# Patient Record
Sex: MALE | Race: WHITE
[De-identification: names, ages, dates, MRNs, and addresses within clinical notes are randomized per-mention and may not be internally consistent; named-entity substitution may affect disease eponyms.]

---

## 2019-12-16 ENCOUNTER — HOSPITAL ENCOUNTER (EMERGENCY)
Dept: HOSPITAL 41 - JD.ED | Age: 24
Discharge: HOME | End: 2019-12-16
Payer: SELF-PAY

## 2019-12-16 DIAGNOSIS — F17.210: ICD-10-CM

## 2019-12-16 DIAGNOSIS — H61.22: Primary | ICD-10-CM

## 2019-12-16 NOTE — EDM.PDOC
ED HPI GENERAL MEDICAL PROBLEM





- General


Chief Complaint: ENT Problem


Stated Complaint: EAR RINGING AND SOUND IS MUFFLED


Time Seen by Provider: 12/16/19 16:55


Source of Information: Reports: Patient


History Limitations: Reports: No Limitations





- History of Present Illness


INITIAL COMMENTS - FREE TEXT/NARRATIVE: 





This unfortunate 24-year-old  male presents in respiratory a complaint 

of decreased hearing to right ear. Patient reports symptoms started 2 days ago 

and progressively worsened since no fever no chills no nausea no vomiting no 

shortness of breath.


  ** Left Ear


Pain Score (Numeric/FACES): 4





- Related Data


 Allergies











Allergy/AdvReac Type Severity Reaction Status Date / Time


 


No Known Allergies Allergy   Verified 12/16/19 17:01











Home Meds: 


 Home Meds





. [No Known Home Meds]  12/16/19 [History]











Past Medical History





- Past Health History


Medical/Surgical History: Denies Medical/Surgical History





Social & Family History





- Tobacco Use


Smoking Status *Q: Current Every Day Smoker


Years of Tobacco use: 4


Packs/Tins Daily: 0.1





ED ROS ENT





- Review of Systems


Review Of Systems: See Below


Constitutional: Denies: Fever, Chills


HEENT: Reports: Hearing Loss.  Denies: Ear Discharge





ED EXAM, ENT





- Physical Exam


Exam: See Below


Exam Limited By: No Limitations


General Appearance: Alert, WD/WN, Mild Distress


Ears: TM Obscured by Cerumen (Left ear)


Nose: Normal Inspection, Normal Mucousa, No Blood


Head: Atraumatic, Normocephalic


Respiratory/Chest: No Respiratory Distress, Lungs Clear, Normal Breath Sounds, 

No Accessory Muscle Use, Chest Non-Tender


Cardiovascular: Normal Peripheral Pulses


GI/Abdominal: Normal Bowel Sounds, Soft, Non-Tender, No Organomegaly, No 

Distention, No Abnormal Bruit, No Mass


Extremities: Normal Inspection, Normal Range of Motion, Non-Tender, No Pedal 

Edema, Normal Capillary Refill


Skin: Warm, Dry, No Rash





ED ENT PROCEDURES





- Additional/Other Procedure(s)


Other (Free Text) Procedure(s): 





Irrigation left ear, irrigated with warm water large amount of cerumen removed 

post evaluation TM normal, canal clear





Course





- Vital Signs


Last Recorded V/S: 





 Last Vital Signs











Temp  98.7 F   12/16/19 16:57


 


Pulse  75   12/16/19 16:57


 


Resp  16   12/16/19 16:57


 


BP  126/70   12/16/19 16:57


 


Pulse Ox  96   12/16/19 16:57














Departure





- Departure


Time of Disposition: 17:17


Disposition: Home, Self-Care 01


Clinical Impression: 


 Impacted cerumen of left ear








- Discharge Information


Referrals: 


PCP,None [Primary Care Provider] - 


Additional Instructions: 


Home, rest, return as needed





Sepsis Event Note





- Evaluation


Sepsis Screening Result: No Definite Risk





- Focused Exam


Vital Signs: 





 Vital Signs











  Temp Pulse Resp BP Pulse Ox


 


 12/16/19 16:57  98.7 F  75  16  126/70  96











Date Exam was Performed: 12/16/19


Time Exam was Performed: 17:15

## 2020-06-03 ENCOUNTER — HOSPITAL ENCOUNTER (EMERGENCY)
Dept: HOSPITAL 41 - JD.ED | Age: 25
Discharge: HOME | End: 2020-06-03
Payer: MEDICAID

## 2020-06-03 DIAGNOSIS — F17.210: ICD-10-CM

## 2020-06-03 DIAGNOSIS — M72.2: Primary | ICD-10-CM

## 2020-06-03 NOTE — EDM.PDOC
ED HPI GENERAL MEDICAL PROBLEM





- General


Chief Complaint: Lower Extremity Injury/Pain


Stated Complaint: ANKLE INJURY


Time Seen by Provider: 06/03/20 20:04


Source of Information: Reports: Patient, Family (Wife)


History Limitations: Reports: No Limitations





- History of Present Illness


INITIAL COMMENTS - FREE TEXT/NARRATIVE: 





Mr. Castillo is a very pleasant 24-year-old man with no chronic medical problems 

and no past surgical history, who now presents to the ED stating that he has 

had right heel pain - he points to the sole of his heel -for the past 2 to 3 

days, after jumping on a trampoline and running.  No other known injury.  He 

denies pain elsewhere to his foot.  No prior similar symptoms.





He states that he took some Tylenol and ibuprofen.





The patient states that his pain MAY be worse when he first gets up in the 

morning, but his wife tells me that his pain is constant all day.





Other than his heel pain, the patient denies recent fever, chills, sore throat, 

ear pain, nasal or sinus congestion, cough, dyspnea, chest pain, palpitations, 

nausea, vomiting, constipation, diarrhea, abdominal pain, urinary symptoms, 

recent weight gain or weight loss, recent bloody bowel movements or black bowel 

movements, recent joint aches, headaches, or rashes.








The patient's PCP is Camille Barnes NP.








  ** Right Foot


Pain Score (Numeric/FACES): 8





- Related Data


 Allergies











Allergy/AdvReac Type Severity Reaction Status Date / Time


 


No Known Allergies Allergy   Verified 06/03/20 20:03











Home Meds: 


 Home Meds





Acetaminophen [Tylenol] 650 mg PO ONCALL PRN 06/03/20 [History]











Past Medical History





- Past Health History


Medical/Surgical History: Denies Medical/Surgical History





Social & Family History





- Tobacco Use


Smoking Status *Q: Current Every Day Smoker


Tobacco Use Within Last Twelve Months: Smokeless Tobacco (Occasionally chews 

tobacco)


Years of Tobacco use: 4


Packs/Tins Daily: 0.3





- Alcohol Use


Alcohol Use History: Yes


Alcohol Use Frequency: Socially





- Recreational Drug Use


Recreational Drug Use: Yes


Drug Use in Last 12 Months: No


Recreational Drug Type: Reports: Marijuana/Hashish (last smoked around 2014)





- Living Situation & Occupation


Living situation: Reports: , with Spouse, with Family (Daughter)


Occupation: Unemployed





Review of Systems





- Review of Systems


Review Of Systems: Comprehensive ROS is negative, except as noted in HPI.





ED EXAM, GENERAL





- Physical Exam


Exam: See Below


Exam Limited By: No Limitations


General Appearance: Alert, WD/WN, No Apparent Distress


Extremities: Other (No visible abnormality to the right ankle or foot, such as 

swelling, erythema, ecchymosis, or abrasion.  There is significant tenderness 

to palpation of the sole of the heel, but no pain is induced with compression 

of the calcaneus laterally and medially.  No significant pain to the remainder 

of the sole of the foot.  Neurovascular status of the right lower extremity is 

intact.)





Course





- Vital Signs


Last Recorded V/S: 


 Last Vital Signs











Temp  36.5 C   06/03/20 20:03


 


Pulse  102 H  06/03/20 20:03


 


Resp  18   06/03/20 20:03


 


BP  101/61   06/03/20 20:03


 


Pulse Ox  98   06/03/20 20:03














- Orders/Labs/Meds


Orders: 


 Active Orders 24 hr











 Category Date Time Status


 


 Foot Comp Min 3V Rt [CR] Stat Exams  06/03/20 20:23 Taken














- Re-Assessments/Exams


Free Text/Narrative Re-Assessment/Exam: 





06/03/20 20:24


As above, the patient has pain to the sole of his right heel, and on examination

, there is point tenderness, with no significant tenderness elsewhere in the 

foot, and no notable abnormalities.  His presentation is most concerning for 

plantar fasciitis, however, because he reports jumping on a trampoline and 

running, I have ordered x-rays of the right foot to rule out a fracture.








06/03/20 20:39


4-view radiographs of the right foot appear to be normal.  No fractures or 

dislocations identified.  Formal read per the Radiologist pending.








06/03/20 21:02


X-rays results and the presumptive diagnosis of plantar fasciitis discussed 

with the patient and his wife.  I am recommending that he wear is soft and 

supportive shoe as he can find, and in the meantime he may take over-the-

counter ibuprofen.  I would like him to follow-up with the Podiatrist Dr. Truong, who, if he agrees with the diagnosis, can fit the patient for 

orthotics.





Departure





- Departure


Time of Disposition: 21:04


Disposition: Home, Self-Care 01


Condition: Good


Clinical Impression: 


 Plantar fasciitis of right foot








- Discharge Information


*PRESCRIPTION DRUG MONITORING PROGRAM REVIEWED*: Not Applicable


*COPY OF PRESCRIPTION DRUG MONITORING REPORT IN PATIENT [facility]: Not Applicable


Instructions:  Plantar Fasciitis


Referrals: 


Camille Barnes NP [Primary Care Provider] - 


Gabe Truong II, DPM [Physician] - 


Forms:  ED Department Discharge


Additional Instructions: 


You were seen in the emergency room for 2 to 3 days of right heel pain.





Work-up in the ER included x-rays of your right foot, which returned normal.  

No broken bones or dislocations were found.





Based on your history, physical exam, and ER x-rays, your heel pain is most 

likely due to plantar fasciitis.





We recommend that you wear soft arch-supporting athletic shoes whenever 

walking.  Avoid walking barefoot or in slippers, sandals, or flip-flops.





Take over-the-counter ibuprofen, 3 tablets (600 mg) every 8 hours, with food, 

as needed for discomfort.





Follow-up with the Podiatrist Dr. Gabe Truong at the next available 

appointment.





If any other problems, please do not hesitate to return to the ER.





Sepsis Event Note





- Evaluation


Sepsis Screening Result: No Definite Risk





- Focused Exam


Vital Signs: 


 Vital Signs











  Temp Pulse Resp BP Pulse Ox


 


 06/03/20 20:03  36.5 C  102 H  18  101/61  98











Date Exam was Performed: 06/04/20


Time Exam was Performed: 00:00





- My Orders


Last 24 Hours: 


My Active Orders





06/03/20 20:23


Foot Comp Min 3V Rt [CR] Stat 














- Assessment/Plan


Last 24 Hours: 


My Active Orders





06/03/20 20:23


Foot Comp Min 3V Rt [CR] Stat

## 2020-06-04 NOTE — CR
Right foot: 4 views of the right foot were obtained.

 

Joint spaces are preserved.  No acute fracture, dislocation or other 

bony abnormality is appreciated.

 

Impression:

1.  No abnormality is identified on right foot exam.

 

Diagnostic code #1

 

This report was dictated in MDT